# Patient Record
Sex: MALE | Race: WHITE | NOT HISPANIC OR LATINO | Employment: FULL TIME | URBAN - NONMETROPOLITAN AREA
[De-identification: names, ages, dates, MRNs, and addresses within clinical notes are randomized per-mention and may not be internally consistent; named-entity substitution may affect disease eponyms.]

---

## 2017-04-19 RX ORDER — FEBUXOSTAT 80 MG/1
TABLET ORAL
Qty: 30 TABLET | Refills: 0 | OUTPATIENT
Start: 2017-04-19

## 2017-04-19 RX ORDER — LISINOPRIL AND HYDROCHLOROTHIAZIDE 20; 12.5 MG/1; MG/1
TABLET ORAL
Qty: 30 TABLET | Refills: 0 | OUTPATIENT
Start: 2017-04-19

## 2017-05-12 ENCOUNTER — LAB (OUTPATIENT)
Dept: INTERNAL MEDICINE | Facility: CLINIC | Age: 37
End: 2017-05-12

## 2017-05-12 DIAGNOSIS — R74.01 ELEVATED AST (SGOT): ICD-10-CM

## 2017-05-12 DIAGNOSIS — E78.00 PURE HYPERCHOLESTEROLEMIA: ICD-10-CM

## 2017-05-12 DIAGNOSIS — I10 BENIGN ESSENTIAL HYPERTENSION: ICD-10-CM

## 2017-05-12 DIAGNOSIS — R74.01 ALT (SGPT) LEVEL RAISED: ICD-10-CM

## 2017-05-12 DIAGNOSIS — M10.179 LEAD-INDUCED GOUT OF FOOT, UNSPECIFIED CHRONICITY, UNSPECIFIED LATERALITY, SEQUELA: ICD-10-CM

## 2017-05-12 DIAGNOSIS — E78.1 ESSENTIAL HYPERTRIGLYCERIDEMIA: ICD-10-CM

## 2017-05-12 DIAGNOSIS — T56.0X1S LEAD-INDUCED GOUT OF FOOT, UNSPECIFIED CHRONICITY, UNSPECIFIED LATERALITY, SEQUELA: ICD-10-CM

## 2017-05-12 LAB
BILIRUB BLD-MCNC: NEGATIVE MG/DL
CLARITY, POC: CLEAR
COLOR UR: YELLOW
GLUCOSE UR STRIP-MCNC: NEGATIVE MG/DL
KETONES UR QL: NEGATIVE
LEUKOCYTE EST, POC: NEGATIVE
NITRITE UR-MCNC: NEGATIVE MG/ML
PH UR: 6 [PH] (ref 5–8)
PROT UR STRIP-MCNC: NEGATIVE MG/DL
RBC # UR STRIP: NEGATIVE /UL
SP GR UR: 1.01 (ref 1–1.03)
UROBILINOGEN UR QL: ABNORMAL

## 2017-05-12 RX ORDER — FEBUXOSTAT 80 MG/1
TABLET ORAL
Qty: 30 TABLET | Refills: 0 | Status: SHIPPED | OUTPATIENT
Start: 2017-05-12 | End: 2017-06-20 | Stop reason: SDUPTHER

## 2017-05-12 RX ORDER — LISINOPRIL AND HYDROCHLOROTHIAZIDE 20; 12.5 MG/1; MG/1
TABLET ORAL
Qty: 30 TABLET | Refills: 0 | Status: SHIPPED | OUTPATIENT
Start: 2017-05-12 | End: 2017-06-20 | Stop reason: SDUPTHER

## 2017-05-13 LAB
25(OH)D3+25(OH)D2 SERPL-MCNC: 14.5 NG/ML
ALBUMIN SERPL-MCNC: 4.7 G/DL (ref 3.5–5)
ALBUMIN/GLOB SERPL: 1.3 G/DL (ref 1–2)
ALP SERPL-CCNC: 86 U/L (ref 38–126)
ALT SERPL-CCNC: 90 U/L (ref 13–69)
AST SERPL-CCNC: 54 U/L (ref 15–46)
BASOPHILS # BLD AUTO: 0.06 10*3/MM3 (ref 0–0.2)
BASOPHILS NFR BLD AUTO: 0.7 % (ref 0–2.5)
BILIRUB SERPL-MCNC: 0.7 MG/DL (ref 0.2–1.3)
BUN SERPL-MCNC: 14 MG/DL (ref 7–20)
BUN/CREAT SERPL: 14 (ref 6.3–21.9)
CALCIUM SERPL-MCNC: 10.4 MG/DL (ref 8.4–10.2)
CHLORIDE SERPL-SCNC: 99 MMOL/L (ref 98–107)
CHOLEST SERPL-MCNC: 196 MG/DL (ref 0–199)
CO2 SERPL-SCNC: 29 MMOL/L (ref 26–30)
CREAT SERPL-MCNC: 1 MG/DL (ref 0.6–1.3)
EOSINOPHIL # BLD AUTO: 0.24 10*3/MM3 (ref 0–0.7)
EOSINOPHIL NFR BLD AUTO: 2.6 % (ref 0–7)
ERYTHROCYTE [DISTWIDTH] IN BLOOD BY AUTOMATED COUNT: 12.8 % (ref 11.5–14.5)
GLOBULIN SER CALC-MCNC: 3.6 GM/DL
GLUCOSE SERPL-MCNC: 106 MG/DL (ref 74–98)
HCT VFR BLD AUTO: 47.6 % (ref 42–52)
HDLC SERPL-MCNC: 42 MG/DL (ref 40–60)
HGB BLD-MCNC: 16.6 G/DL (ref 14–18)
IMM GRANULOCYTES # BLD: 0.07 10*3/MM3 (ref 0–0.06)
IMM GRANULOCYTES NFR BLD: 0.8 % (ref 0–0.6)
LDLC SERPL CALC-MCNC: 122 MG/DL (ref 0–99)
LYMPHOCYTES # BLD AUTO: 2.41 10*3/MM3 (ref 0.6–3.4)
LYMPHOCYTES NFR BLD AUTO: 26.5 % (ref 10–50)
MCH RBC QN AUTO: 31.3 PG (ref 27–31)
MCHC RBC AUTO-ENTMCNC: 34.9 G/DL (ref 30–37)
MCV RBC AUTO: 89.8 FL (ref 80–94)
MONOCYTES # BLD AUTO: 0.71 10*3/MM3 (ref 0–0.9)
MONOCYTES NFR BLD AUTO: 7.8 % (ref 0–12)
NEUTROPHILS # BLD AUTO: 5.6 10*3/MM3 (ref 2–6.9)
NEUTROPHILS NFR BLD AUTO: 61.6 % (ref 37–80)
NRBC BLD AUTO-RTO: 0 /100 WBC (ref 0–0)
PLATELET # BLD AUTO: 400 10*3/MM3 (ref 130–400)
POTASSIUM SERPL-SCNC: 5.1 MMOL/L (ref 3.5–5.1)
PROT SERPL-MCNC: 8.3 G/DL (ref 6.3–8.2)
RBC # BLD AUTO: 5.3 10*6/MM3 (ref 4.7–6.1)
SODIUM SERPL-SCNC: 140 MMOL/L (ref 137–145)
T4 FREE SERPL-MCNC: 1.35 NG/DL (ref 0.78–2.19)
TRIGL SERPL-MCNC: 158 MG/DL
TSH SERPL DL<=0.005 MIU/L-ACNC: 2.11 MIU/ML (ref 0.47–4.68)
URATE SERPL-MCNC: 6.4 MG/DL (ref 2.5–8.5)
VLDLC SERPL CALC-MCNC: 31.6 MG/DL
WBC # BLD AUTO: 9.09 10*3/MM3 (ref 4.8–10.8)

## 2017-06-20 RX ORDER — LISINOPRIL AND HYDROCHLOROTHIAZIDE 20; 12.5 MG/1; MG/1
TABLET ORAL
Qty: 30 TABLET | Refills: 0 | Status: SHIPPED | OUTPATIENT
Start: 2017-06-20 | End: 2017-07-26 | Stop reason: SDUPTHER

## 2017-06-20 RX ORDER — FEBUXOSTAT 80 MG/1
TABLET ORAL
Qty: 30 TABLET | Refills: 0 | Status: SHIPPED | OUTPATIENT
Start: 2017-06-20 | End: 2017-07-26 | Stop reason: SDUPTHER

## 2017-07-26 RX ORDER — LISINOPRIL AND HYDROCHLOROTHIAZIDE 20; 12.5 MG/1; MG/1
TABLET ORAL
Qty: 30 TABLET | Refills: 0 | Status: SHIPPED | OUTPATIENT
Start: 2017-07-26 | End: 2017-09-04 | Stop reason: SDUPTHER

## 2017-07-26 RX ORDER — FEBUXOSTAT 80 MG/1
TABLET ORAL
Qty: 30 TABLET | Refills: 0 | Status: SHIPPED | OUTPATIENT
Start: 2017-07-26 | End: 2017-09-04 | Stop reason: SDUPTHER

## 2017-08-02 ENCOUNTER — OFFICE VISIT (OUTPATIENT)
Dept: NEUROLOGY | Facility: CLINIC | Age: 37
End: 2017-08-02

## 2017-08-02 VITALS
DIASTOLIC BLOOD PRESSURE: 98 MMHG | OXYGEN SATURATION: 96 % | SYSTOLIC BLOOD PRESSURE: 140 MMHG | WEIGHT: 315 LBS | HEART RATE: 116 BPM | BODY MASS INDEX: 37.19 KG/M2 | HEIGHT: 77 IN

## 2017-08-02 DIAGNOSIS — G47.19 EXCESSIVE DAYTIME SLEEPINESS: Primary | ICD-10-CM

## 2017-08-02 PROCEDURE — 99214 OFFICE O/P EST MOD 30 MIN: CPT | Performed by: NURSE PRACTITIONER

## 2017-08-02 NOTE — PROGRESS NOTES
"Subjective   Manan Santoyo is a 36 y.o. male     Chief Complaint   Patient presents with   • Sleeping Problem     Patient c/o snoring at night, daytime sleepiness, lack of energy, and headaches.       History of Present Illness     Pt is a very pleasant 36 yr old male in clinic to est with neurology.  States + sleep disturbance + snoring @ night with excessive daytime sleepiness + lack of energy.  States onset x yrs. But worse x 6 months.       Review of Systems   Constitutional: Negative for chills and fever.   HENT: Positive for postnasal drip and rhinorrhea. Negative for congestion, ear pain, hearing loss and sore throat.    Eyes: Negative for pain, discharge and redness.   Respiratory: Negative for cough, shortness of breath, wheezing and stridor.    Cardiovascular: Negative for chest pain, palpitations and leg swelling.   Gastrointestinal: Negative for abdominal pain, constipation, nausea and vomiting.   Endocrine: Negative for cold intolerance, heat intolerance and polyphagia.   Genitourinary: Negative for dysuria, flank pain, frequency and urgency.   Musculoskeletal: Positive for arthralgias. Negative for joint swelling, myalgias, neck pain and neck stiffness.   Skin: Negative for pallor, rash and wound.   Allergic/Immunologic: Negative for environmental allergies.   Neurological: Negative for dizziness, tremors, seizures, syncope, facial asymmetry, speech difficulty, weakness, light-headedness, numbness and headaches.   Hematological: Negative for adenopathy.   Psychiatric/Behavioral: Positive for sleep disturbance. Negative for confusion and hallucinations. The patient is not nervous/anxious.        Objective         Vitals:    08/02/17 1415   BP: 140/98   Pulse: 116   SpO2: 96%   Weight: (!) 438 lb (199 kg)   Height: 77\" (195.6 cm)     GENERAL: Patient is pleasant, cooperative, appears to be stated age.  Body habitus is endomorphic.  SKIN AND EXTREMITIES:  No skin rashes or lesions are noted.  No " cyanosis, clubbing or edema of the extremities.    HEAD:  Head is normocephalic and atraumatic.    NECK: Neck are non-tender without thyromegaly or adenopathy.  Carotic upstrokes are 1+/4.  No cranial or cervical bruits.  The neck is supple with a full range of motion.   ENT: palate elevate symmetrically, no evidence of high arch palate, tongue midline erythema in posterior pharynx, Mallampati Classification Class III   CARDIOVASCULAR:  Regular rate and rhythm with normal S1 and S2 without rub or gallop.  RESPIRATORY:  Clear to auscultation without wheezes or crackle   BACK:  Back is straight without midline defect.    PSYCH:  Higher cortical function/mental status:  The patient is alert.  He  is oriented x3 to time, place and person.  Recent and the remote memory appear normal.  The patient has a good fund of knowledge.  There is no visual or auditory hallucination or suicidal or homicidal ideation.  SPEECH:There is no gross evidence of aphasia, dysarthria or agnosia.      MOTOR: Strength is 5/5 throughout with normal tone and bulk  No involuntary movements noted.    COORDINATION AND GAIT:  The patient walks with a narrow-based gait.   MUSCULOSKELETAL: Range of motion normal, no clubbing, cyanosis, or edema.  No joint swelling.     Results for orders placed or performed in visit on 05/12/17   Lipid Panel   Result Value Ref Range    Total Cholesterol 196 0 - 199 mg/dL    Triglycerides 158 (H) <150 mg/dL    HDL Cholesterol 42 40 - 60 mg/dL    VLDL Cholesterol 31.6 mg/dL    LDL Cholesterol  122 (H) 0 - 99 mg/dL   Comprehensive Metabolic Panel   Result Value Ref Range    Glucose 106 (H) 74 - 98 mg/dL    BUN 14 7 - 20 mg/dL    Creatinine 1.00 0.60 - 1.30 mg/dL    eGFR Non African Am 84 >60 mL/min/1.73    eGFR African Am 101 >60 mL/min/1.73    BUN/Creatinine Ratio 14.0 6.3 - 21.9    Sodium 140 137 - 145 mmol/L    Potassium 5.1 3.5 - 5.1 mmol/L    Chloride 99 98 - 107 mmol/L    Total CO2 29.0 26.0 - 30.0 mmol/L    Calcium  10.4 (H) 8.4 - 10.2 mg/dL    Total Protein 8.3 (H) 6.3 - 8.2 g/dL    Albumin 4.70 3.50 - 5.00 g/dL    Globulin 3.6 gm/dL    A/G Ratio 1.3 1.0 - 2.0 g/dL    Total Bilirubin 0.7 0.2 - 1.3 mg/dL    Alkaline Phosphatase 86 38 - 126 U/L    AST (SGOT) 54 (H) 15 - 46 U/L    ALT (SGPT) 90 (H) 13 - 69 U/L   T4, Free   Result Value Ref Range    Free T4 1.35 0.78 - 2.19 ng/dL   TSH   Result Value Ref Range    TSH 2.110 0.470 - 4.680 mIU/mL   Vitamin D 25 Hydroxy   Result Value Ref Range    25 Hydroxy, Vitamin D 14.5 ng/mL   Uric Acid   Result Value Ref Range    Uric Acid 6.4 2.5 - 8.5 mg/dL   POC Urinalysis Dipstick, Automated   Result Value Ref Range    Color Yellow Yellow, Straw, Dark Yellow, Gretchen    Clarity, UA Clear Clear    Glucose, UA Negative Negative, 1000 mg/dL (3+) mg/dL    Bilirubin Negative Negative    Ketones, UA Negative Negative    Specific Gravity  1.010 1.005 - 1.030    Blood, UA Negative Negative    pH, Urine 6.0 5.0 - 8.0    Protein, POC Negative Negative mg/dL    Urobilinogen, UA 1 E.U./dL  (A) Normal    Leukocytes Negative Negative    Nitrite, UA Negative Negative   CBC & Differential   Result Value Ref Range    WBC 9.09 4.80 - 10.80 10*3/mm3    RBC 5.30 4.70 - 6.10 10*6/mm3    Hemoglobin 16.6 14.0 - 18.0 g/dL    Hematocrit 47.6 42.0 - 52.0 %    MCV 89.8 80.0 - 94.0 fL    MCH 31.3 (H) 27.0 - 31.0 pg    MCHC 34.9 30.0 - 37.0 g/dL    RDW 12.8 11.5 - 14.5 %    Platelets 400 130 - 400 10*3/mm3    Neutrophil Rel % 61.6 37.0 - 80.0 %    Lymphocyte Rel % 26.5 10.0 - 50.0 %    Monocyte Rel % 7.8 0.0 - 12.0 %    Eosinophil Rel % 2.6 0.0 - 7.0 %    Basophil Rel % 0.7 0.0 - 2.5 %    Neutrophils Absolute 5.60 2.00 - 6.90 10*3/mm3    Lymphocytes Absolute 2.41 0.60 - 3.40 10*3/mm3    Monocytes Absolute 0.71 0.00 - 0.90 10*3/mm3    Eosinophils Absolute 0.24 0.00 - 0.70 10*3/mm3    Basophils Absolute 0.06 0.00 - 0.20 10*3/mm3    Immature Granulocyte Rel % 0.8 (H) 0.0 - 0.6 %    Immature Grans Absolute 0.07 (H) 0.00 -  0.06 10*3/mm3    nRBC 0.0 0.0 - 0.0 /100 WBC       Assessment/Plan     1. Sleep disorder: excessive daytime sleepiness: sleep study ordered.

## 2017-08-16 ENCOUNTER — HOSPITAL ENCOUNTER (OUTPATIENT)
Dept: SLEEP MEDICINE | Facility: HOSPITAL | Age: 37
Discharge: HOME | End: 2017-08-16
Admitting: NURSE PRACTITIONER

## 2017-08-16 DIAGNOSIS — G47.19 EXCESSIVE DAYTIME SLEEPINESS: ICD-10-CM

## 2017-08-16 PROCEDURE — 95811 POLYSOM 6/>YRS CPAP 4/> PARM: CPT

## 2017-08-16 PROCEDURE — 95811 POLYSOM 6/>YRS CPAP 4/> PARM: CPT | Performed by: PSYCHIATRY & NEUROLOGY

## 2017-09-05 RX ORDER — LISINOPRIL AND HYDROCHLOROTHIAZIDE 20; 12.5 MG/1; MG/1
TABLET ORAL
Qty: 30 TABLET | Refills: 11 | Status: SHIPPED | OUTPATIENT
Start: 2017-09-05

## 2017-09-05 RX ORDER — FEBUXOSTAT 80 MG/1
TABLET ORAL
Qty: 30 TABLET | Refills: 11 | Status: SHIPPED | OUTPATIENT
Start: 2017-09-05

## 2017-09-29 ENCOUNTER — OFFICE VISIT (OUTPATIENT)
Dept: NEUROLOGY | Facility: CLINIC | Age: 37
End: 2017-09-29

## 2017-09-29 VITALS
BODY MASS INDEX: 37.19 KG/M2 | WEIGHT: 315 LBS | TEMPERATURE: 106 F | SYSTOLIC BLOOD PRESSURE: 146 MMHG | HEIGHT: 77 IN | OXYGEN SATURATION: 98 % | DIASTOLIC BLOOD PRESSURE: 92 MMHG

## 2017-09-29 DIAGNOSIS — Z99.81 ON HOME O2: ICD-10-CM

## 2017-09-29 DIAGNOSIS — G47.33 OSA TREATED WITH BIPAP: Primary | ICD-10-CM

## 2017-09-29 PROCEDURE — 99214 OFFICE O/P EST MOD 30 MIN: CPT | Performed by: NURSE PRACTITIONER

## 2017-09-29 NOTE — PROGRESS NOTES
Subjective   Manan Santoyo is a 36 y.o. male     Chief Complaint   Patient presents with   • Follow-up     sleep study       History of Present Illness     Is very pleasant 36-year-old male in clinic today to follow-up his sleep study.  In reviewing the sleep study with patient and patient is aware that there was an emergent use of CPAP machine which patient did not correct and had to be placed on BiPAP emergently during the sleep exam.  Patient continues with feeling exhaustion lack of energy excessive daytime sleepiness with a BMI of 51.9.    Past Hx: Pt is a very pleasant 36 yr old male in clinic to est with neurology.  States + sleep disturbance + snoring @ night with excessive daytime sleepiness + lack of energy.  States onset x yrs. But worse x 6 months    The following portions of the patient's history were reviewed and updated as appropriate: allergies, current medications, past family history, past medical history, past social history, past surgical history and problem list.    Review of Systems  Constitutional: Negative for chills and fever.   HENT: Positive for postnasal drip and rhinorrhea. Negative for congestion, ear pain, hearing loss and sore throat.    Eyes: Negative for pain, discharge and redness.   Respiratory: Negative for cough, shortness of breath, wheezing and stridor.    Cardiovascular: Negative for chest pain, palpitations and leg swelling.   Gastrointestinal: Negative for abdominal pain, constipation, nausea and vomiting.   Endocrine: Negative for cold intolerance, heat intolerance and polyphagia.   Genitourinary: Negative for dysuria, flank pain, frequency and urgency.   Musculoskeletal: Positive for arthralgias. Negative for joint swelling, myalgias, neck pain and neck stiffness.   Skin: Negative for pallor, rash and wound.   Allergic/Immunologic: Negative for environmental allergies.   Neurological: Negative for dizziness, tremors, seizures, syncope, facial asymmetry, speech  "difficulty, weakness, light-headedness, numbness and headaches.   Hematological: Negative for adenopathy.   Psychiatric/Behavioral: Positive for sleep disturbance. Negative for confusion and hallucinations. The patient is not nervous/anxious.    Objective         Vitals:    09/29/17 1515   BP: 146/92   Temp: (!) 106 °F (41.1 °C)   SpO2: 98%   Weight: (!) 438 lb (199 kg)   Height: 77\" (195.6 cm)     GENERAL: Patient is pleasant, cooperative, appears to be stated age.    NECK: Neck are non-tender without thyromegaly or adenopathy.  Carotic upstrokes are 1+/4.  No cranial or cervical bruits.  The neck is supple with a full range of motion.   ENT: palate elevate symmetrically, no evidence of high arch palate, tongue midline erythema in posterior pharynx, Mallampati Classification Class III   CARDIOVASCULAR:  Regular rate and rhythm with normal S1 and S2 without rub or gallop.  RESPIRATORY:  Clear to auscultation without wheezes or crackle   PSYCH:  Higher cortical function/mental status:  The patient is alert.  He  is oriented x3 to time, place and person.  Recent and the remote memory appear normal.  The patient has a good fund of knowledge.  There is no visual or auditory hallucination or suicidal or homicidal ideation.  SPEECH:There is no gross evidence of aphasia, dysarthria or agnosia.      MUSCULOSKELETAL: Range of motion normal, no clubbing, cyanosis, or edema.  No joint swelling.     Results for orders placed or performed in visit on 05/12/17   Lipid Panel   Result Value Ref Range    Total Cholesterol 196 0 - 199 mg/dL    Triglycerides 158 (H) <150 mg/dL    HDL Cholesterol 42 40 - 60 mg/dL    VLDL Cholesterol 31.6 mg/dL    LDL Cholesterol  122 (H) 0 - 99 mg/dL   Comprehensive Metabolic Panel   Result Value Ref Range    Glucose 106 (H) 74 - 98 mg/dL    BUN 14 7 - 20 mg/dL    Creatinine 1.00 0.60 - 1.30 mg/dL    eGFR Non African Am 84 >60 mL/min/1.73    eGFR African Am 101 >60 mL/min/1.73    BUN/Creatinine Ratio " 14.0 6.3 - 21.9    Sodium 140 137 - 145 mmol/L    Potassium 5.1 3.5 - 5.1 mmol/L    Chloride 99 98 - 107 mmol/L    Total CO2 29.0 26.0 - 30.0 mmol/L    Calcium 10.4 (H) 8.4 - 10.2 mg/dL    Total Protein 8.3 (H) 6.3 - 8.2 g/dL    Albumin 4.70 3.50 - 5.00 g/dL    Globulin 3.6 gm/dL    A/G Ratio 1.3 1.0 - 2.0 g/dL    Total Bilirubin 0.7 0.2 - 1.3 mg/dL    Alkaline Phosphatase 86 38 - 126 U/L    AST (SGOT) 54 (H) 15 - 46 U/L    ALT (SGPT) 90 (H) 13 - 69 U/L   T4, Free   Result Value Ref Range    Free T4 1.35 0.78 - 2.19 ng/dL   TSH   Result Value Ref Range    TSH 2.110 0.470 - 4.680 mIU/mL   Vitamin D 25 Hydroxy   Result Value Ref Range    25 Hydroxy, Vitamin D 14.5 ng/mL   Uric Acid   Result Value Ref Range    Uric Acid 6.4 2.5 - 8.5 mg/dL   POC Urinalysis Dipstick, Automated   Result Value Ref Range    Color Yellow Yellow, Straw, Dark Yellow, Gretchen    Clarity, UA Clear Clear    Glucose, UA Negative Negative, 1000 mg/dL (3+) mg/dL    Bilirubin Negative Negative    Ketones, UA Negative Negative    Specific Gravity  1.010 1.005 - 1.030    Blood, UA Negative Negative    pH, Urine 6.0 5.0 - 8.0    Protein, POC Negative Negative mg/dL    Urobilinogen, UA 1 E.U./dL  (A) Normal    Leukocytes Negative Negative    Nitrite, UA Negative Negative   CBC & Differential   Result Value Ref Range    WBC 9.09 4.80 - 10.80 10*3/mm3    RBC 5.30 4.70 - 6.10 10*6/mm3    Hemoglobin 16.6 14.0 - 18.0 g/dL    Hematocrit 47.6 42.0 - 52.0 %    MCV 89.8 80.0 - 94.0 fL    MCH 31.3 (H) 27.0 - 31.0 pg    MCHC 34.9 30.0 - 37.0 g/dL    RDW 12.8 11.5 - 14.5 %    Platelets 400 130 - 400 10*3/mm3    Neutrophil Rel % 61.6 37.0 - 80.0 %    Lymphocyte Rel % 26.5 10.0 - 50.0 %    Monocyte Rel % 7.8 0.0 - 12.0 %    Eosinophil Rel % 2.6 0.0 - 7.0 %    Basophil Rel % 0.7 0.0 - 2.5 %    Neutrophils Absolute 5.60 2.00 - 6.90 10*3/mm3    Lymphocytes Absolute 2.41 0.60 - 3.40 10*3/mm3    Monocytes Absolute 0.71 0.00 - 0.90 10*3/mm3    Eosinophils Absolute 0.24 0.00  - 0.70 10*3/mm3    Basophils Absolute 0.06 0.00 - 0.20 10*3/mm3    Immature Granulocyte Rel % 0.8 (H) 0.0 - 0.6 %    Immature Grans Absolute 0.07 (H) 0.00 - 0.06 10*3/mm3    nRBC 0.0 0.0 - 0.0 /100 WBC         I have personally reviewed the above labs     Assessment/Plan   1.  Sleep apnea: Patient with severe sleep apnea greater then 90 events an hour averaging 400 and evening.  Patient oxygen saturation below 92 even with BiPAP.  Reviewed the whole sleep study with patient and he is aware that we are acquiring a BiPAP ASAP for patient  BiPAP 24/14 with 2 L of oxygen.  Staff has contacted a [employer] will be able to bring patient's BiPAP tomorrow morning.    I have counseled patient extensively on Sleep Hygiene including regular sleep wake schedule and stimulus control therapy.  I have also discussed the importance of weight reduction because 10% reduction in body weight can reduce sleep apnea by 50 %. We have also discussed abstaining from smoking and drinking.  I have explained to patient that obstructive apnea episode is defined as the absence of airflow for at least 10 seconds.  Sleep apnea is usually accompanied by snoring, disturbed sleep, and daytime sleepiness. Patients with micrognathia, retrognathia, enlarged tonsils, tongue enlargement, and acromegaly are especially predisposed to obstructive sleep apnea. Abnormalities or weakness in the muscles can also contribute to obstructive sleep apnea. Obesity can also contribute to sleep apnea.     Sleep apnea can lead to a number of complications, ranging from daytime sleepiness to possible increased risk of cardiovascular risks.   Daytime sleepiness is the most serious.  Daytime sleepiness can also increase the risk for accident-related injuries. Several studies have suggested that people with sleep apnea have two to three times as many car accidents, and five to seven times the risk for multiple accidents.   A number of cardiovascular  diseases -- including high blood pressure, heart failure, stroke, and heart arrhythmias -- have an association with obstructive sleep apnea.   Up to a third of patients with heart failure also have sleep apnea. Both central and obstructive sleep apnea are linked with heart failure. Obstructive sleep apnea is also noted to be associated with type 2 diabetes according to Dr. Workman at [facility].  The best treatment for symptomatic obstructive sleep apnea is continuous positive airflow pressure (CPAP). Bilevel positive airway pressure (BPAP) systems may be particularly helpful for patients with coexisting lung disease and those with excessive levels of carbon dioxide.  Other treatment options including UPPP surgery, LAUP surgery, radiofrequency somnoplasty and dental appliances such [place] or Clearway.

## 2017-11-01 ENCOUNTER — OFFICE VISIT (OUTPATIENT)
Dept: NEUROLOGY | Facility: CLINIC | Age: 37
End: 2017-11-01

## 2017-11-01 VITALS
BODY MASS INDEX: 37.19 KG/M2 | HEART RATE: 104 BPM | WEIGHT: 315 LBS | HEIGHT: 77 IN | DIASTOLIC BLOOD PRESSURE: 86 MMHG | SYSTOLIC BLOOD PRESSURE: 144 MMHG | OXYGEN SATURATION: 97 %

## 2017-11-01 DIAGNOSIS — G47.33 OSA TREATED WITH BIPAP: Primary | ICD-10-CM

## 2017-11-01 PROCEDURE — 99213 OFFICE O/P EST LOW 20 MIN: CPT | Performed by: NURSE PRACTITIONER

## 2017-11-01 NOTE — PROGRESS NOTES
Subjective   Manan Santoyo is a 37 y.o. male     Chief Complaint   Patient presents with   • Follow-up     Pt is here for for a Fu for BIPAP. Pt states that he feels totally different since started using his machine. Pt states that he is complaint with using his machine.        History of Present Illness     Pt very pleasant 38 yo in clinic to follow up JARON with Bipap.  Reviewed sleep study in clinic (see below) Pt states feeling much better, well rested better energy during the day    Past Hx:Is very pleasant 36-year-old male in clinic today to follow-up his sleep study.  In reviewing the sleep study with patient and patient is aware that there was an emergent use of CPAP machine which patient did not correct and had to be placed on BiPAP emergently during the sleep exam.  Patient continues with feeling exhaustion lack of energy excessive daytime sleepiness with a BMI of 51.9.      Pt is a very pleasant 36 yr old male in clinic to est with neurology.  States + sleep disturbance + snoring @ night with excessive daytime sleepiness + lack of energy.  States onset x yrs. But worse x 6 months       The following portions of the patient's history were reviewed and updated as appropriate: allergies, current medications, past family history, past medical history, past social history, past surgical history and problem list.    Review of Systems   Constitutional: Negative for chills and fever.   HENT: Negative for congestion, ear pain, hearing loss, rhinorrhea and sore throat.    Eyes: Negative for pain, discharge and redness.   Respiratory: Negative for cough, shortness of breath, wheezing and stridor.    Cardiovascular: Negative for chest pain, palpitations and leg swelling.   Gastrointestinal: Negative for abdominal pain, constipation, nausea and vomiting.   Endocrine: Negative for cold intolerance, heat intolerance and polyphagia.   Genitourinary: Negative for dysuria, flank pain, frequency and urgency.  "  Musculoskeletal: Positive for arthralgias and myalgias. Negative for joint swelling, neck pain and neck stiffness.   Skin: Negative for pallor, rash and wound.   Allergic/Immunologic: Negative for environmental allergies.   Neurological: Negative for dizziness, tremors, seizures, syncope, facial asymmetry, speech difficulty, weakness, light-headedness, numbness and headaches.   Hematological: Negative for adenopathy.   Psychiatric/Behavioral: Positive for sleep disturbance. Negative for confusion and hallucinations. The patient is not nervous/anxious.        Objective         Vitals:    11/01/17 1551   BP: 144/86   Pulse: 104   SpO2: 97%   Weight: (!) 428 lb (194 kg)   Height: 77\" (195.6 cm)     GENERAL: Patient is pleasant, cooperative, appears to be stated age.  Body habitus is endomorphic.  HEAD:  Head is normocephalic and atraumatic.    NECK: Neck are non-tender without thyromegaly or adenopathy.  Carotic upstrokes are 1+/4.  No cranial or cervical bruits.  The neck is supple with a full range of motion.   CARDIOVASCULAR:  Regular rate and rhythm with normal S1 and S2 without rub or gallop.  RESPIRATORY:  Clear to auscultation without wheezes or crackle   COORDINATION AND GAIT:  The patient walks with a narrow-based gait.      Results for orders placed or performed in visit on 05/12/17   Lipid Panel   Result Value Ref Range    Total Cholesterol 196 0 - 199 mg/dL    Triglycerides 158 (H) <150 mg/dL    HDL Cholesterol 42 40 - 60 mg/dL    VLDL Cholesterol 31.6 mg/dL    LDL Cholesterol  122 (H) 0 - 99 mg/dL   Comprehensive Metabolic Panel   Result Value Ref Range    Glucose 106 (H) 74 - 98 mg/dL    BUN 14 7 - 20 mg/dL    Creatinine 1.00 0.60 - 1.30 mg/dL    eGFR Non African Am 84 >60 mL/min/1.73    eGFR African Am 101 >60 mL/min/1.73    BUN/Creatinine Ratio 14.0 6.3 - 21.9    Sodium 140 137 - 145 mmol/L    Potassium 5.1 3.5 - 5.1 mmol/L    Chloride 99 98 - 107 mmol/L    Total CO2 29.0 26.0 - 30.0 mmol/L    Calcium " 10.4 (H) 8.4 - 10.2 mg/dL    Total Protein 8.3 (H) 6.3 - 8.2 g/dL    Albumin 4.70 3.50 - 5.00 g/dL    Globulin 3.6 gm/dL    A/G Ratio 1.3 1.0 - 2.0 g/dL    Total Bilirubin 0.7 0.2 - 1.3 mg/dL    Alkaline Phosphatase 86 38 - 126 U/L    AST (SGOT) 54 (H) 15 - 46 U/L    ALT (SGPT) 90 (H) 13 - 69 U/L   T4, Free   Result Value Ref Range    Free T4 1.35 0.78 - 2.19 ng/dL   TSH   Result Value Ref Range    TSH 2.110 0.470 - 4.680 mIU/mL   Vitamin D 25 Hydroxy   Result Value Ref Range    25 Hydroxy, Vitamin D 14.5 ng/mL   Uric Acid   Result Value Ref Range    Uric Acid 6.4 2.5 - 8.5 mg/dL   POC Urinalysis Dipstick, Automated   Result Value Ref Range    Color Yellow Yellow, Straw, Dark Yellow, Gretchen    Clarity, UA Clear Clear    Glucose, UA Negative Negative, 1000 mg/dL (3+) mg/dL    Bilirubin Negative Negative    Ketones, UA Negative Negative    Specific Gravity  1.010 1.005 - 1.030    Blood, UA Negative Negative    pH, Urine 6.0 5.0 - 8.0    Protein, POC Negative Negative mg/dL    Urobilinogen, UA 1 E.U./dL  (A) Normal    Leukocytes Negative Negative    Nitrite, UA Negative Negative   CBC & Differential   Result Value Ref Range    WBC 9.09 4.80 - 10.80 10*3/mm3    RBC 5.30 4.70 - 6.10 10*6/mm3    Hemoglobin 16.6 14.0 - 18.0 g/dL    Hematocrit 47.6 42.0 - 52.0 %    MCV 89.8 80.0 - 94.0 fL    MCH 31.3 (H) 27.0 - 31.0 pg    MCHC 34.9 30.0 - 37.0 g/dL    RDW 12.8 11.5 - 14.5 %    Platelets 400 130 - 400 10*3/mm3    Neutrophil Rel % 61.6 37.0 - 80.0 %    Lymphocyte Rel % 26.5 10.0 - 50.0 %    Monocyte Rel % 7.8 0.0 - 12.0 %    Eosinophil Rel % 2.6 0.0 - 7.0 %    Basophil Rel % 0.7 0.0 - 2.5 %    Neutrophils Absolute 5.60 2.00 - 6.90 10*3/mm3    Lymphocytes Absolute 2.41 0.60 - 3.40 10*3/mm3    Monocytes Absolute 0.71 0.00 - 0.90 10*3/mm3    Eosinophils Absolute 0.24 0.00 - 0.70 10*3/mm3    Basophils Absolute 0.06 0.00 - 0.20 10*3/mm3    Immature Granulocyte Rel % 0.8 (H) 0.0 - 0.6 %    Immature Grans Absolute 0.07 (H) 0.00 -  0.06 10*3/mm3    nRBC 0.0 0.0 - 0.0 /100 WBC         I have personally reviewed the above labs and imaging studies.    Assessment/Plan     1.  Sleep apnea: Patient with severe sleep apnea greater then 90 events an hour averaging 400 and evening.  Patient oxygen saturation below 92 even with BiPAP.  Reviewed sleep compliance + mask leak.  Pt to work with DME continue BiPAP 24/14 with 2 L of oxygen.       I have counseled patient extensively on Sleep Hygiene including regular sleep wake schedule and stimulus control therapy.  I have also discussed the importance of weight reduction because 10% reduction in body weight can reduce sleep apnea by 50 %. We have also discussed abstaining from smoking and drinking.  I have explained to patient that obstructive apnea episode is defined as the absence of airflow for at least 10 seconds.  Sleep apnea is usually accompanied by snoring, disturbed sleep, and daytime sleepiness. Patients with micrognathia, retrognathia, enlarged tonsils, tongue enlargement, and acromegaly are especially predisposed to obstructive sleep apnea. Abnormalities or weakness in the muscles can also contribute to obstructive sleep apnea. Obesity can also contribute to sleep apnea.      Sleep apnea can lead to a number of complications, ranging from daytime sleepiness to possible increased risk of cardiovascular risks.   Daytime sleepiness is the most serious.  Daytime sleepiness can also increase the risk for accident-related injuries. Several studies have suggested that people with sleep apnea have two to three times as many car accidents, and five to seven times the risk for multiple accidents.   A number of cardiovascular diseases -- including high blood pressure, heart failure, stroke, and heart arrhythmias -- have an association with obstructive sleep apnea.   Up to a third of patients with heart failure also have sleep apnea. Both central and obstructive sleep apnea are linked with heart failure.  Obstructive sleep apnea is also noted to be associated with type 2 diabetes according to Dr. Workman at [facility].  The best treatment for symptomatic obstructive sleep apnea is continuous positive airflow pressure (CPAP). Bilevel positive airway pressure (BPAP) systems may be particularly helpful for patients with coexisting lung disease and those with excessive levels of carbon dioxide.  Other treatment options including UPPP surgery, LAUP surgery, radiofrequency somnoplasty and dental appliances such [place] or Clearway.

## 2017-11-02 RX ORDER — FEXOFENADINE HCL AND PSEUDOEPHEDRINE HCI 180; 240 MG/1; MG/1
TABLET, EXTENDED RELEASE ORAL
Qty: 60 TABLET | Refills: 9 | Status: SHIPPED | OUTPATIENT
Start: 2017-11-02

## 2018-02-23 ENCOUNTER — OFFICE VISIT (OUTPATIENT)
Dept: NEUROLOGY | Facility: CLINIC | Age: 38
End: 2018-02-23

## 2018-02-23 VITALS
BODY MASS INDEX: 37.19 KG/M2 | DIASTOLIC BLOOD PRESSURE: 66 MMHG | OXYGEN SATURATION: 99 % | WEIGHT: 315 LBS | HEIGHT: 77 IN | SYSTOLIC BLOOD PRESSURE: 124 MMHG | HEART RATE: 104 BPM

## 2018-02-23 DIAGNOSIS — Z99.81 ON HOME O2: ICD-10-CM

## 2018-02-23 DIAGNOSIS — G47.33 OSA TREATED WITH BIPAP: Primary | ICD-10-CM

## 2018-02-23 PROCEDURE — 99213 OFFICE O/P EST LOW 20 MIN: CPT | Performed by: NURSE PRACTITIONER

## 2018-08-24 ENCOUNTER — OFFICE VISIT (OUTPATIENT)
Dept: NEUROLOGY | Facility: CLINIC | Age: 38
End: 2018-08-24

## 2018-08-24 VITALS
WEIGHT: 315 LBS | DIASTOLIC BLOOD PRESSURE: 78 MMHG | HEART RATE: 106 BPM | SYSTOLIC BLOOD PRESSURE: 126 MMHG | HEIGHT: 77 IN | OXYGEN SATURATION: 99 % | BODY MASS INDEX: 37.19 KG/M2

## 2018-08-24 DIAGNOSIS — G47.33 OSA TREATED WITH BIPAP: Primary | ICD-10-CM

## 2018-08-24 PROCEDURE — 99213 OFFICE O/P EST LOW 20 MIN: CPT | Performed by: NURSE PRACTITIONER

## 2018-11-20 ENCOUNTER — TELEPHONE (OUTPATIENT)
Dept: NEUROLOGY | Facility: CLINIC | Age: 38
End: 2018-11-20

## 2018-11-20 NOTE — TELEPHONE ENCOUNTER
PT'S WIFE CALLED REQUESTING A NEW ORDER FOR SUPPLIES BE SENT TO [employer]. SHE ASKED IF WE COULD FAX TO BOTH THE [place] AND THE [place] OFFICE AS IT APPEARS THE TWO OFFICES DO NOT COMMUNICATE VERY WELL WITH EACH OTHER.     PT WAS LAST SEEN ON 08/24/2018 FOLLOW UP IS SCHEDULED FOR ONE YEAR

## 2018-12-17 ENCOUNTER — TELEPHONE (OUTPATIENT)
Dept: NEUROLOGY | Facility: CLINIC | Age: 38
End: 2018-12-17

## 2018-12-17 NOTE — TELEPHONE ENCOUNTER
Patient's wife called requesting an order be sent to [facility] for Supplies and Oxygen Concentrator.  They no longer want to use [employer].    Please Advise

## 2020-01-17 ENCOUNTER — TELEPHONE (OUTPATIENT)
Dept: NEUROLOGY | Facility: CLINIC | Age: 40
End: 2020-01-17

## 2020-01-17 NOTE — TELEPHONE ENCOUNTER
Notified pt that he would need to have an apt before that could happen as it has been over a year since he was seen. I did inform him that Ninfa was leaving the office so he could either wait to see the new APRN's or call to get an apt with a pulmonologist. Pt verbalized understanding.

## 2025-04-29 ENCOUNTER — OFFICE VISIT (OUTPATIENT)
Dept: INTERNAL MEDICINE | Facility: CLINIC | Age: 45
End: 2025-04-29
Payer: COMMERCIAL

## 2025-04-29 VITALS
RESPIRATION RATE: 22 BRPM | HEART RATE: 70 BPM | SYSTOLIC BLOOD PRESSURE: 135 MMHG | OXYGEN SATURATION: 99 % | HEIGHT: 77 IN | DIASTOLIC BLOOD PRESSURE: 85 MMHG | WEIGHT: 315 LBS | TEMPERATURE: 97.7 F | BODY MASS INDEX: 37.19 KG/M2

## 2025-04-29 DIAGNOSIS — R79.9 ABNORMAL BLOOD CHEMISTRY: ICD-10-CM

## 2025-04-29 DIAGNOSIS — Z12.11 ENCOUNTER FOR SCREENING FOR MALIGNANT NEOPLASM OF COLON: ICD-10-CM

## 2025-04-29 DIAGNOSIS — G47.33 OSA TREATED WITH BIPAP: Primary | ICD-10-CM

## 2025-04-29 DIAGNOSIS — J30.1 SEASONAL ALLERGIC RHINITIS DUE TO POLLEN: ICD-10-CM

## 2025-04-29 DIAGNOSIS — E55.9 VITAMIN D DEFICIENCY: ICD-10-CM

## 2025-04-29 DIAGNOSIS — I10 BENIGN ESSENTIAL HYPERTENSION: ICD-10-CM

## 2025-04-29 DIAGNOSIS — Z00.00 ANNUAL PHYSICAL EXAM: ICD-10-CM

## 2025-04-29 DIAGNOSIS — R79.89 ELEVATED LFTS: ICD-10-CM

## 2025-04-29 RX ORDER — LISINOPRIL 10 MG/1
10 TABLET ORAL DAILY
Qty: 90 TABLET | Refills: 0 | Status: SHIPPED | OUTPATIENT
Start: 2025-04-29

## 2025-04-29 RX ORDER — FEXOFENADINE HCL AND PSEUDOEPHEDRINE HCL 180; 240 MG/1; MG/1
1 TABLET, EXTENDED RELEASE ORAL DAILY
Qty: 60 TABLET | Refills: 9 | Status: SHIPPED | OUTPATIENT
Start: 2025-04-29

## 2025-04-30 NOTE — PROGRESS NOTES
Subjective   Manan Santoyo is a 44 y.o. male.     History of Present Illness  Presents to establish for annual  Labs due  Colorectal cancer screening never done    Bin - needs a referral back to sleep med since it has been so long since he has seen them and bipap is having issues  Htn - has been off his meds for about a month. No cp, soa, diaphoresis      The following portions of the patient's history were reviewed and updated as appropriate: allergies, current medications, past family history, past medical history, past social history, past surgical history, and problem list.    Review of Systems   HENT:  Negative for congestion and sore throat.    Respiratory:  Negative for cough.    Neurological:  Negative for headaches.   All other systems reviewed and are negative.      Objective   Physical Exam  Vitals and nursing note reviewed.   Constitutional:       Appearance: Normal appearance. He is normal weight.   HENT:      Head: Normocephalic and atraumatic.      Right Ear: External ear normal.      Left Ear: External ear normal.      Nose: Nose normal.      Mouth/Throat:      Mouth: Mucous membranes are moist.      Pharynx: Oropharynx is clear.   Eyes:      Extraocular Movements: Extraocular movements intact.      Conjunctiva/sclera: Conjunctivae normal.      Pupils: Pupils are equal, round, and reactive to light.   Cardiovascular:      Rate and Rhythm: Normal rate and regular rhythm.      Pulses: Normal pulses.      Heart sounds: Normal heart sounds. No murmur heard.     No gallop.   Pulmonary:      Effort: Pulmonary effort is normal. No respiratory distress.      Breath sounds: Normal breath sounds. No wheezing, rhonchi or rales.   Abdominal:      General: Abdomen is flat. Bowel sounds are normal.      Palpations: Abdomen is soft.   Musculoskeletal:         General: Normal range of motion.      Cervical back: Normal range of motion and neck supple. No rigidity or tenderness.   Lymphadenopathy:      Cervical:  No cervical adenopathy.   Skin:     General: Skin is warm.      Capillary Refill: Capillary refill takes less than 2 seconds.      Coloration: Skin is not jaundiced or pale.      Findings: No bruising, erythema, lesion or rash.   Neurological:      General: No focal deficit present.      Mental Status: He is alert and oriented to person, place, and time. Mental status is at baseline.   Psychiatric:         Mood and Affect: Mood normal.         Behavior: Behavior normal.         Thought Content: Thought content normal.         Judgment: Judgment normal.         Assessment & Plan   Diagnoses and all orders for this visit:    1. JARON treated with BiPAP (Primary)  -     Ambulatory Referral to Sleep Medicine  -     CBC (No Diff)  -     Comprehensive Metabolic Panel  -     Hemoglobin A1c  -     Hepatitis C Antibody  -     Lipid Panel  -     TSH  -     Vitamin B12  -     Vitamin D,25-Hydroxy    2. Benign essential hypertension  -     lisinopril (PRINIVIL,ZESTRIL) 10 MG tablet; Take 1 tablet by mouth Daily.  Dispense: 90 tablet; Refill: 0  -     CBC (No Diff)  -     Comprehensive Metabolic Panel  -     Hemoglobin A1c  -     Hepatitis C Antibody  -     Lipid Panel  -     TSH  -     Vitamin B12  -     Vitamin D,25-Hydroxy    3. Seasonal allergic rhinitis due to pollen  -     fexofenadine-pseudoephedrine (ALLEGRA-D 24) 180-240 MG per 24 hr tablet; Take 1 tablet by mouth Daily.  Dispense: 60 tablet; Refill: 9  -     CBC (No Diff)  -     Comprehensive Metabolic Panel  -     Hemoglobin A1c  -     Hepatitis C Antibody  -     Lipid Panel  -     TSH  -     Vitamin B12  -     Vitamin D,25-Hydroxy    4. Annual physical exam  -     CBC (No Diff)  -     Comprehensive Metabolic Panel  -     Hemoglobin A1c  -     Hepatitis C Antibody  -     Lipid Panel  -     TSH  -     Vitamin B12  -     Vitamin D,25-Hydroxy    5. Vitamin D deficiency  -     CBC (No Diff)  -     Comprehensive Metabolic Panel  -     Hemoglobin A1c  -     Hepatitis C  Antibody  -     Lipid Panel  -     TSH  -     Vitamin B12  -     Vitamin D,25-Hydroxy    6. Abnormal blood chemistry  -     CBC (No Diff)  -     Comprehensive Metabolic Panel  -     Hemoglobin A1c  -     Hepatitis C Antibody  -     Lipid Panel  -     TSH  -     Vitamin B12  -     Vitamin D,25-Hydroxy    7. Elevated LFTs  -     CBC (No Diff)  -     Comprehensive Metabolic Panel  -     Hemoglobin A1c  -     Hepatitis C Antibody  -     Lipid Panel  -     TSH  -     Vitamin B12  -     Vitamin D,25-Hydroxy    8. Encounter for screening for malignant neoplasm of colon  -     Cologuard - Stool, Per Rectum; Future     Counseled on heart healthy diet  Counseled on exercise 30 minutes a day 5 days a week  Follow with optometry once a year  Follow with dentistry twice a year  Brush and floss twice a day  Wear seatbelt while in a car  Decrease lisinopril hctz to plain lisinopril. Monitor at home

## 2025-05-02 RX ORDER — FEBUXOSTAT 80 MG/1
80 TABLET, FILM COATED ORAL DAILY
Qty: 30 TABLET | Refills: 11 | Status: SHIPPED | OUTPATIENT
Start: 2025-05-02

## 2025-05-02 NOTE — TELEPHONE ENCOUNTER
Rx Refill Note  Requested Prescriptions     Signed Prescriptions Disp Refills    febuxostat (Uloric) 80 MG tablet tablet 30 tablet 11     Sig: Take 1 tablet by mouth Daily.     Authorizing Provider: CINDY OWENS     Ordering User: NATALIE FORBES      Last office visit with prescribing clinician: 4/29/2025   Last telemedicine visit with prescribing clinician: Visit date not found   Next office visit with prescribing clinician: Visit date not found                         Would you like a call back once the refill request has been completed: [] Yes [] No    If the office needs to give you a call back, can they leave a voicemail: [] Yes [] No    Natalie Forbes MA  05/02/25, 16:15 EDT

## 2025-05-15 ENCOUNTER — TELEPHONE (OUTPATIENT)
Dept: INTERNAL MEDICINE | Facility: CLINIC | Age: 45
End: 2025-05-15
Payer: COMMERCIAL

## 2025-05-15 NOTE — TELEPHONE ENCOUNTER
Called patient regarding overdue lab results. Reached voicemail. Left detailed message regarding active orders.

## 2025-06-16 ENCOUNTER — TELEPHONE (OUTPATIENT)
Dept: INTERNAL MEDICINE | Facility: CLINIC | Age: 45
End: 2025-06-16
Payer: COMMERCIAL

## 2025-08-15 ENCOUNTER — OFFICE VISIT (OUTPATIENT)
Dept: SLEEP MEDICINE | Age: 45
End: 2025-08-15
Payer: COMMERCIAL

## 2025-08-15 VITALS
HEART RATE: 78 BPM | SYSTOLIC BLOOD PRESSURE: 138 MMHG | TEMPERATURE: 98.5 F | OXYGEN SATURATION: 98 % | HEIGHT: 77 IN | DIASTOLIC BLOOD PRESSURE: 88 MMHG | BODY MASS INDEX: 37.19 KG/M2 | WEIGHT: 315 LBS

## 2025-08-15 DIAGNOSIS — G47.33 OBSTRUCTIVE SLEEP APNEA, ADULT: Primary | ICD-10-CM

## 2025-08-15 DIAGNOSIS — E66.01 MORBID (SEVERE) OBESITY DUE TO EXCESS CALORIES: ICD-10-CM
